# Patient Record
Sex: FEMALE | Race: BLACK OR AFRICAN AMERICAN
[De-identification: names, ages, dates, MRNs, and addresses within clinical notes are randomized per-mention and may not be internally consistent; named-entity substitution may affect disease eponyms.]

---

## 2020-05-02 ENCOUNTER — HOSPITAL ENCOUNTER (EMERGENCY)
Dept: HOSPITAL 41 - JD.ED | Age: 30
Discharge: HOME | End: 2020-05-02
Payer: SELF-PAY
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
## 2020-05-02 DIAGNOSIS — S50.11XA: ICD-10-CM

## 2020-05-02 DIAGNOSIS — S63.501A: Primary | ICD-10-CM

## 2020-05-02 DIAGNOSIS — S10.93XA: ICD-10-CM
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
## 2020-05-03 NOTE — CR
Right forearm: 2 views of the right forearm were obtained.

 

Comparison: No prior forearm study is available.

 

No fracture, dislocation or other bony abnormality is appreciated.

 

Impression:

1.  No abnormality is appreciated on 2 view right forearm study.

 

Diagnostic code #1

 

This report was dictated in MDT